# Patient Record
Sex: MALE | Race: WHITE
[De-identification: names, ages, dates, MRNs, and addresses within clinical notes are randomized per-mention and may not be internally consistent; named-entity substitution may affect disease eponyms.]

---

## 2020-04-29 ENCOUNTER — HOSPITAL ENCOUNTER (INPATIENT)
Dept: HOSPITAL 96 - M.ORTHSURG | Age: 75
LOS: 2 days | Discharge: NURSING, REHAB OR LONG-TERM CARE | DRG: 475 | End: 2020-05-01
Attending: INTERNAL MEDICINE | Admitting: INTERNAL MEDICINE
Payer: COMMERCIAL

## 2020-04-29 VITALS — SYSTOLIC BLOOD PRESSURE: 133 MMHG | DIASTOLIC BLOOD PRESSURE: 48 MMHG

## 2020-04-29 VITALS — WEIGHT: 182 LBS | BODY MASS INDEX: 26.05 KG/M2 | HEIGHT: 70 IN

## 2020-04-29 VITALS — SYSTOLIC BLOOD PRESSURE: 145 MMHG | DIASTOLIC BLOOD PRESSURE: 64 MMHG

## 2020-04-29 VITALS — SYSTOLIC BLOOD PRESSURE: 122 MMHG | DIASTOLIC BLOOD PRESSURE: 44 MMHG

## 2020-04-29 DIAGNOSIS — I25.10: ICD-10-CM

## 2020-04-29 DIAGNOSIS — Z96.1: ICD-10-CM

## 2020-04-29 DIAGNOSIS — I73.9: ICD-10-CM

## 2020-04-29 DIAGNOSIS — Z79.2: ICD-10-CM

## 2020-04-29 DIAGNOSIS — R71.0: ICD-10-CM

## 2020-04-29 DIAGNOSIS — Z79.84: ICD-10-CM

## 2020-04-29 DIAGNOSIS — Z89.429: ICD-10-CM

## 2020-04-29 DIAGNOSIS — Z79.82: ICD-10-CM

## 2020-04-29 DIAGNOSIS — Z79.899: ICD-10-CM

## 2020-04-29 DIAGNOSIS — Z87.891: ICD-10-CM

## 2020-04-29 DIAGNOSIS — Z90.89: ICD-10-CM

## 2020-04-29 DIAGNOSIS — E78.5: ICD-10-CM

## 2020-04-29 DIAGNOSIS — I10: ICD-10-CM

## 2020-04-29 DIAGNOSIS — M86.671: Primary | ICD-10-CM

## 2020-04-29 DIAGNOSIS — Z95.820: ICD-10-CM

## 2020-04-29 DIAGNOSIS — Z95.5: ICD-10-CM

## 2020-04-29 DIAGNOSIS — E11.40: ICD-10-CM

## 2020-04-29 DIAGNOSIS — Z95.1: ICD-10-CM

## 2020-04-29 LAB
ABSOLUTE BASOPHILS: 0 THOU/UL (ref 0–0.2)
ABSOLUTE EOSINOPHILS: 0.3 THOU/UL (ref 0–0.7)
ABSOLUTE MONOCYTES: 1.1 THOU/UL (ref 0–1.2)
ANION GAP SERPL CALC-SCNC: 8 MMOL/L (ref 7–16)
BASOPHILS NFR BLD AUTO: 0.1 %
BUN SERPL-MCNC: 25 MG/DL (ref 7–18)
CALCIUM SERPL-MCNC: 8.8 MG/DL (ref 8.5–10.1)
CHLORIDE SERPL-SCNC: 104 MMOL/L (ref 98–107)
CO2 SERPL-SCNC: 29 MMOL/L (ref 21–32)
CREAT SERPL-MCNC: 1 MG/DL (ref 0.6–1.3)
EOSINOPHIL NFR BLD: 3.4 %
GLUCOSE SERPL-MCNC: 134 MG/DL (ref 70–99)
GRANULOCYTES NFR BLD MANUAL: 71.3 %
HCT VFR BLD CALC: 32.9 % (ref 42–52)
HGB BLD-MCNC: 10.8 GM/DL (ref 14–18)
LYMPHOCYTES # BLD: 1.3 THOU/UL (ref 0.8–5.3)
LYMPHOCYTES NFR BLD AUTO: 13.9 %
MCH RBC QN AUTO: 28 PG (ref 26–34)
MCHC RBC AUTO-ENTMCNC: 32.8 G/DL (ref 28–37)
MCV RBC: 85.5 FL (ref 80–100)
MONOCYTES NFR BLD: 11.3 %
MPV: 8.3 FL. (ref 7.2–11.1)
NEUTROPHILS # BLD: 6.9 THOU/UL (ref 1.6–8.1)
NUCLEATED RBCS: 0 /100WBC
PLATELET COUNT*: 307 THOU/UL (ref 150–400)
POTASSIUM SERPL-SCNC: 4 MMOL/L (ref 3.5–5.1)
RBC # BLD AUTO: 3.85 MIL/UL (ref 4.5–6)
RDW-CV: 20 % (ref 10.5–14.5)
SODIUM SERPL-SCNC: 141 MMOL/L (ref 136–145)
WBC # BLD AUTO: 9.6 THOU/UL (ref 4–11)

## 2020-04-29 PROCEDURE — 0Y6H0Z2 DETACHMENT AT RIGHT LOWER LEG, MID, OPEN APPROACH: ICD-10-PCS | Performed by: SURGERY

## 2020-04-29 NOTE — EKG
[place] [address]
Phone:  [phone]                     ELECTROCARDIOGRAM REPORT      
_______________________________________________________________________________
 
Name:         NEERURENEE FRANCO               Room:          [address] [place] 
[facility].#:    [id number]     Account #:     [id number]  
Admission:    20    Attend Phys:   Ismael Henderson
Discharge:                Date of Birth: [birth year]  
Date of Service: 20  Report #:      [id number]
        [id number]
_______________________________________________________________________________
THIS REPORT FOR:  //name//                      
 
                          [facility]
                                       
Test Date:    2020               Test Time:    07:28:18
Pat Name:     RENEE ANDRADE            Department:   
Patient ID:   [id number]            Room:         [facility]
Gender:       M                        Technician:   
:          1945               Requested By: Ulysses Light
Order Number: [id number]    Uma MD:   Darci Padron
                                 Measurements
Intervals                              Axis          
Rate:         74                       P:            38
MA:           147                      QRS:          59
QRSD:         115                      T:            261
QT:           363                                    
QTc:          403                                    
                           Interpretive Statements
Sinus rhythm
Nonspecific intraventricular conduction delay
Early repolarization
No previous ECG available for comparison
 
Electronically Signed On 2020 17:29:30 CDT by Darci Padron
[email or web address]
 
 
 
 
 
 
 
 
 
 
 
 
 
 
 
 
 
 
 
  <ELECTRONICALLY SIGNED>
                                           By: Darci Padron MD, [facility]   
  20     1729
D: 20   _____________________________________
T: 20   Darci Padron MD, FACC     /EPI

## 2020-04-29 NOTE — OP
[facility] 
[address] [place]  [address]                    OPERATIVE REPORT              
_______________________________________________________________________________
 
Name:       RENEE ANDRADE STEPHEN                Room:           [address] [place]  
.R.#:  [id number]      Account #:      [id number]  
Admission:  04/29/20     Attend Phys:    HILARIA Gilliland
Discharge:               Date of Birth:  [birth year]  
         Report #: [id number]
                                                                     [id number]  
_______________________________________________________________________________
THIS REPORT FOR:  //name//                      
 
cc:  Neri Delarosa MD, John MD                                                     ~
THIS REPORT FOR:  //name//                      
 
CC: Neri Henderson
 
DATE OF SERVICE:  04/29/2020
 
 
PREOPERATIVE DIAGNOSIS:  Chronic osteomyelitis, right foot in need a below-knee
amputation.
 
POSTOPERATIVE DIAGNOSIS:  Chronic osteomyelitis, right foot in need a below-knee
amputation.
 
PROCEDURE:  Right below-knee amputation.
 
COMPLICATIONS:  None.
 
ESTIMATED BLOOD LOSS:  200 mL.
 
SPECIMEN:  Includes right below-knee amputation.
 
SURGEON:  Ulysses Light MD
 
ASSISTANT:  JHON Thomason.
 
COMPLICATIONS:  None.
 
INDICATIONS FOR PROCEDURE:  The patient is a very pleasant 74-year-old white
male who has diabetes.  He has nonhealing diabetic foot ulcer of his right foot 
with now osteomyelitis of the calcaneus bone.  His foot was no longer
salvageable.  We plan an urgent right below-knee amputation.  Informed consent
was obtained from the patient with risks including but not limited to bleeding,
infection, need for further surgery, pain, death, heart attack, stroke, higher
amputation.  The patient understood these risks and was agreeable to proceed.
 
DESCRIPTION OF PROCEDURE:  The patient was taken to the OR and placed in supine
position.  After adequate general anesthesia was initiated, his right leg was
prepped and draped in usual sterile fashion.  Timeout was performed.  I created
a transverse incision across the tibia just one handbreadth below the tibial
tuberosity.  Sharp and blunt dissections were carried down the tibia and fibula.
 
 
 
[place] [address]                    OPERATIVE REPORT              
_______________________________________________________________________________
 
Name:       RENEE ANDRADE                Room:           [address] [place]  
[facility]#:  [id number]      Account #:      [id number]  
Admission:  04/29/20     Attend Phys:    HILARIA Gilliland
Discharge:               Date of Birth:  [birth year]  
         Report #: [id number]
                                                                     [id number]  
_______________________________________________________________________________
 These were divided with a bone saw.  I then used an amputation knife to create
a posterior flap with the calf muscle.  Specimen was removed off the field. 
Bleeding was controlled with interrupted 2-0 silk suture as well as
electrocautery.  I irrigated with 1 liter of antibiotic saline.  I closed the
flap anteriorly using interrupted 2-0 Vicryl, 3-0 Vicryl and staples for the
skin.  Incision was dressed with Prevena wound VAC.  The patient was taken alert
and awake to recovery room in good condition.  All needle and instrument counts
were correct.
 
 
 
 
 
 
 
 
 
 
 
 
 
 
 
 
 
 
 
 
 
 
 
 
 
 
 
 
 
 
 
 
 
 
 
 
<ELECTRONICALLY SIGNED>
                                        By:  Ulysses Light MD             
04/29/20     1056
D: 04/29/20 1004_______________________________________
T: 04/29/20 101MD aníbal Coburn

## 2020-04-29 NOTE — NUR
PATIENT RESTING IN BED. RIGHT LEG ELEVATED ON PILLOW. DRESSING TO RIGHT LEG
CLEAN, DRY AND INTACT WITH KNEE IMMOBILIZER ON. PATIENT HAS COMPLAINTS OF
MINIMAL PAIN, HYDROCODONE GIVEN X 1. PATIENT HAS GOOD APPETITE. PATIENT DENIES
ANY NEEDS AT THIS TIME. CALL LIGHT WITHIN REACH.

## 2020-04-30 VITALS — SYSTOLIC BLOOD PRESSURE: 135 MMHG | DIASTOLIC BLOOD PRESSURE: 46 MMHG

## 2020-04-30 VITALS — DIASTOLIC BLOOD PRESSURE: 52 MMHG | SYSTOLIC BLOOD PRESSURE: 146 MMHG

## 2020-04-30 VITALS — DIASTOLIC BLOOD PRESSURE: 59 MMHG | SYSTOLIC BLOOD PRESSURE: 133 MMHG

## 2020-04-30 VITALS — DIASTOLIC BLOOD PRESSURE: 51 MMHG | SYSTOLIC BLOOD PRESSURE: 138 MMHG

## 2020-04-30 VITALS — DIASTOLIC BLOOD PRESSURE: 58 MMHG | SYSTOLIC BLOOD PRESSURE: 116 MMHG

## 2020-04-30 LAB
ABSOLUTE BASOPHILS: 0 THOU/UL (ref 0–0.2)
ABSOLUTE EOSINOPHILS: 0.2 THOU/UL (ref 0–0.7)
ABSOLUTE MONOCYTES: 1.1 THOU/UL (ref 0–1.2)
ANION GAP SERPL CALC-SCNC: 7 MMOL/L (ref 7–16)
BASOPHILS NFR BLD AUTO: 0.4 %
BUN SERPL-MCNC: 22 MG/DL (ref 7–18)
CALCIUM SERPL-MCNC: 8.2 MG/DL (ref 8.5–10.1)
CHLORIDE SERPL-SCNC: 104 MMOL/L (ref 98–107)
CO2 SERPL-SCNC: 27 MMOL/L (ref 21–32)
CREAT SERPL-MCNC: 1 MG/DL (ref 0.6–1.3)
EOSINOPHIL NFR BLD: 1.9 %
GLUCOSE SERPL-MCNC: 101 MG/DL (ref 70–99)
GRANULOCYTES NFR BLD MANUAL: 76.8 %
HCT VFR BLD CALC: 26.4 % (ref 42–52)
HGB BLD-MCNC: 8.6 GM/DL (ref 14–18)
LYMPHOCYTES # BLD: 1.2 THOU/UL (ref 0.8–5.3)
LYMPHOCYTES NFR BLD AUTO: 11.1 %
MCH RBC QN AUTO: 28 PG (ref 26–34)
MCHC RBC AUTO-ENTMCNC: 32.6 G/DL (ref 28–37)
MCV RBC: 85.7 FL (ref 80–100)
MONOCYTES NFR BLD: 9.8 %
MPV: 8.2 FL. (ref 7.2–11.1)
NEUTROPHILS # BLD: 8.3 THOU/UL (ref 1.6–8.1)
NUCLEATED RBCS: 0 /100WBC
PLATELET COUNT*: 240 THOU/UL (ref 150–400)
POTASSIUM SERPL-SCNC: 4.3 MMOL/L (ref 3.5–5.1)
RBC # BLD AUTO: 3.08 MIL/UL (ref 4.5–6)
RDW-CV: 19.4 % (ref 10.5–14.5)
SODIUM SERPL-SCNC: 138 MMOL/L (ref 136–145)
WBC # BLD AUTO: 10.8 THOU/UL (ref 4–11)

## 2020-04-30 NOTE — NUR
PATIENT GIVEN PRN HYDROCODONE FOR RIGHT LEG PAIN, GOOD RELIEF NOTED. IV
REMAINS SL, FLUSHING WITHOUT DIFFICULTY. PATIENT C/O BEING SWEATY THIS
AFTERNOON AT APPROX 1400, BS AT THAT TIME 239 AND TEMP 99.3. PATIENT CHECKED
ON AGAIN SOON AFTER AND TEMP NOTED TO BE 98.4 AND PATIENT STATED HE WAS NO
LONGER SWEATY AND WAS FEELING FINE. INSULIN GIVEN WITH MEALS AS ORDERED. PT TO
WORK WITH PATIENT TOMORROW. WILL ATTEMPT TO HELP PATIENT INTO CHAIR FOR
DINNER. HINTON DRAINING LARGE AMOUNTS OF CLEAR YELLOW URINE. RIGHT LEG DRESSING
D/I AND WOUND VAC REMAINS IN PLACE. REHAB CONSULT PLACED.

## 2020-04-30 NOTE — NUR
PT A&O, VSS ON 1-2L O2 BY NC. MEDS GIVEN AS ORDERED. DRESSING C/D/I. PAIN
MANAGED WITH NORCO. PT COULD NOT VOID, 821ML ON BLADDER SCAN. HINTON CATH
PLACED. RT LEG ELEVATED ON PILLOW. KNEE IMMOBILIZER, WOUND VAC IN PLACE.
HOURLY ROUNDINGS COMPLETED. WILL CONTINUE TO MONITOR.

## 2020-04-30 NOTE — NUR
SW called pt to complete initial assessment, introduce self, and SW role.  Pt
alert, oriented, pleasant, talkative.  Pt lives at home with his wife in a
split entry home.  Pt has hx of inpt rehab at [place] and says he learned
how to go up and down stairs on his bottom.  pt has 2 wc, 2 walkers, crutches
and a shower chair and grab bars.  Pt planning for inpt rehab at [place] and would
prefer inpt rehab at [facility].  Pt said he wasn't able to participate in therapies
yet because of issues with catheterization but understands that he will work
with therapies and will need to be evaluated to determine if pt is able to be
accepted to inpt rehab.  Pt has supportive family.  SW to continue to follow
to assist with safe dc planning.

## 2020-05-01 ENCOUNTER — HOSPITAL ENCOUNTER (INPATIENT)
Dept: HOSPITAL 96 - M.REH | Age: 75
LOS: 14 days | Discharge: HOME HEALTH | DRG: 638 | End: 2020-05-15
Attending: PHYSICAL MEDICINE & REHABILITATION | Admitting: PHYSICAL MEDICINE & REHABILITATION
Payer: COMMERCIAL

## 2020-05-01 VITALS — DIASTOLIC BLOOD PRESSURE: 64 MMHG | SYSTOLIC BLOOD PRESSURE: 122 MMHG

## 2020-05-01 VITALS — SYSTOLIC BLOOD PRESSURE: 140 MMHG | DIASTOLIC BLOOD PRESSURE: 53 MMHG

## 2020-05-01 VITALS — SYSTOLIC BLOOD PRESSURE: 123 MMHG | DIASTOLIC BLOOD PRESSURE: 62 MMHG

## 2020-05-01 VITALS — DIASTOLIC BLOOD PRESSURE: 59 MMHG | SYSTOLIC BLOOD PRESSURE: 108 MMHG

## 2020-05-01 VITALS — WEIGHT: 182.5 LBS | HEIGHT: 70.98 IN | BODY MASS INDEX: 25.55 KG/M2

## 2020-05-01 DIAGNOSIS — I25.10: ICD-10-CM

## 2020-05-01 DIAGNOSIS — N40.0: ICD-10-CM

## 2020-05-01 DIAGNOSIS — Z89.511: ICD-10-CM

## 2020-05-01 DIAGNOSIS — Z79.899: ICD-10-CM

## 2020-05-01 DIAGNOSIS — E11.69: Primary | ICD-10-CM

## 2020-05-01 DIAGNOSIS — Z95.1: ICD-10-CM

## 2020-05-01 DIAGNOSIS — E11.51: ICD-10-CM

## 2020-05-01 DIAGNOSIS — D63.8: ICD-10-CM

## 2020-05-01 DIAGNOSIS — M86.68: ICD-10-CM

## 2020-05-01 DIAGNOSIS — I10: ICD-10-CM

## 2020-05-01 DIAGNOSIS — D50.9: ICD-10-CM

## 2020-05-01 DIAGNOSIS — E78.5: ICD-10-CM

## 2020-05-01 LAB
ABSOLUTE BASOPHILS: 0 THOU/UL (ref 0–0.2)
ABSOLUTE EOSINOPHILS: 0.1 THOU/UL (ref 0–0.7)
ABSOLUTE MONOCYTES: 1 THOU/UL (ref 0–1.2)
ALBUMIN SERPL-MCNC: 3 G/DL (ref 3.4–5)
ALP SERPL-CCNC: 85 U/L (ref 46–116)
ALT SERPL-CCNC: 23 U/L (ref 30–65)
ANION GAP SERPL CALC-SCNC: 9 MMOL/L (ref 7–16)
AST SERPL-CCNC: 25 U/L (ref 15–37)
BASOPHILS NFR BLD AUTO: 0.2 %
BILIRUB SERPL-MCNC: 0.3 MG/DL
BUN SERPL-MCNC: 18 MG/DL (ref 7–18)
CALCIUM SERPL-MCNC: 8.5 MG/DL (ref 8.5–10.1)
CHLORIDE SERPL-SCNC: 101 MMOL/L (ref 98–107)
CO2 SERPL-SCNC: 30 MMOL/L (ref 21–32)
CREAT SERPL-MCNC: 1.1 MG/DL (ref 0.6–1.3)
EOSINOPHIL NFR BLD: 1.4 %
GLUCOSE SERPL-MCNC: 116 MG/DL (ref 70–99)
GRANULOCYTES NFR BLD MANUAL: 80.8 %
HCT VFR BLD CALC: 28.3 % (ref 42–52)
HGB BLD-MCNC: 9.4 GM/DL (ref 14–18)
IRON SERPL-MCNC: 12 UG/DL (ref 50–175)
LYMPHOCYTES # BLD: 0.9 THOU/UL (ref 0.8–5.3)
LYMPHOCYTES NFR BLD AUTO: 8.2 %
MCH RBC QN AUTO: 28.5 PG (ref 26–34)
MCHC RBC AUTO-ENTMCNC: 33.1 G/DL (ref 28–37)
MCV RBC: 86.1 FL (ref 80–100)
MONOCYTES NFR BLD: 9.4 %
MPV: 9 FL. (ref 7.2–11.1)
NEUTROPHILS # BLD: 8.6 THOU/UL (ref 1.6–8.1)
NUCLEATED RBCS: 0 /100WBC
PLATELET COUNT*: 277 THOU/UL (ref 150–400)
POTASSIUM SERPL-SCNC: 3.9 MMOL/L (ref 3.5–5.1)
PROT SERPL-MCNC: 7.2 G/DL (ref 6.4–8.2)
RBC # BLD AUTO: 3.29 MIL/UL (ref 4.5–6)
RDW-CV: 19.1 % (ref 10.5–14.5)
SAO2 % BLD FROM PO2: 5 % (ref 20–39)
SODIUM SERPL-SCNC: 140 MMOL/L (ref 136–145)
WBC # BLD AUTO: 10.7 THOU/UL (ref 4–11)

## 2020-05-01 NOTE — NUR
PT A&O, ON RA. DRESSING TO RT STUMP INTACT. WOUND VAC IN PLACE. MEDS GIVEN AS
ORDERED. PAIN WELL CONTROLLED WITH NORCO. PT SLEEPING THROUGH THE NIGHT. HINTON
IN PLACE. HOURLY ROUNDINGS COMPLETED. WILL CONTINUE TO MONITOR.

## 2020-05-01 NOTE — NUR
PATIENT UP TO CHAIR AM WITH PT, SAT IN RECLINER FOR APPROX 2 HOURS. PATIENT
WORKED WITH PT AND OT THIS SHIFT.  IMMOBILIZER IN PLACE TO RIGHT BKA, WOUND
VAC IN PLACE WITH SMALL AMOUNT OF BLOODY DRAINAGE NOTED. JAMAAL STARR'HILARIA AT 0900,
DR. CARDONA NOTIFIED THAT PATIENT HAD NOT VOIDED AT 1400, PER DR. CARDONA GIVE
PATIENT MORE TIME TO VOID ON HIS OWN. IV REMAINS SL. HYDROCODONE GIVEN X 1
THIS SHIFT. PATIENT TO DISCHARGE UP TO REHAB THIS EVENING, PATIENT AWARE OF
PLAN OF CARE.

## 2020-05-01 NOTE — PATH
[address]  [address]                    PATHOLOGY RPT PROCEDURE       
_______________________________________________________________________________
 
Name:       RENEE ANDRADE                Room:           [address] [place]  
M.R.#:  [id number]      Account #:      [id number]  
Admission:  04/29/20     Date of Birth:  [birth year]  
Discharge:                             Report #:    [id number]
                                                         Path Case #: [id number]
_______________________________________________________________________________
 
[facility] Accession Number: [id number]
.                                                                01
Material submitted:                                        .
leg - RIGHT BELOW THE KNEE LEG. Modifiers: right
.                                                                01
Clinical history:                                          .
Atherosclerosis right leg
.                                                                02
**********************************************************************
Diagnosis:
Right below knee leg:
- Benign right lower leg with evidence of prior transmetatarsal
amputations of all five toes and non-specific ulceration at distal/plantar
aspect with underlying bone showing osteomyelitis and reparative changes.
- Severe calcifying arteriosclerosis of anterior and posterior tibial
arteries.
(RONNIE/db; 5/01/2020)
LBQ  05/01/2020  1223 Local
**********************************************************************
.                                                                02
Electronically signed:                                     .
Mariano Norris MD, Pathologist
NPI- [id number]
.                                                                01
Gross description:                                         .
The specimen is received fresh in a red biohazard bag, labeled "Renee Andrade, right below knee leg".  Received is a right below-the-knee
amputation measuring 14.2 cm from heel to distal foot stump, 15.0 cm from
heel to skin margin, 29.3 cm from heel to fibular bone margin, and 31.0 cm
from heel to tibial bone margin.  The skin and soft tissue margins appear
viable.  All five toes are absent due to a prior transmetatarsal
amputation.  At the distal aspect of the foot and continuing onto the
plantar aspect, there is a well circumscribed, irregular in contour,
partially crusted and yellow-tan to pink-red lesion measuring 12.8 x 10.4
cm.  Sectioning through the anterior and posterior tibial vasculatures
reveals pinpoint to slightly patent lumens with a slight amount of
calcification identified.  The specimen is submitted representatively as
follows:
.
A1   skin and soft tissue margin
A2   representative section of bone underlying pink-right aspect of lesion
on plantar aspect of foot, following decalcification
A3   additional representative sections of lesion on dorsal/plantar aspect
of foot
A4   anterior and posterior tibial vasculatures.
(CAA; 4/30/2020)
QAC/QAC  05/01/2020  [facility]
.                                                                02
 
[place] [address]                    PATHOLOGY RPT PROCEDURE       
_______________________________________________________________________________
 
Name:       RENEE ANDRADE                Room:           [address] [place]  
..#:  [id number]      Account #:      [id number]  
Admission:  04/29/20     Date of Birth:  [birth year]  
Discharge:                             Report #:    [id number]
                                                         Path Case #: [id number]
_______________________________________________________________________________
Pathologist provided ICD-10:
I70.239, M86.8X6
.                                                                02
CPT                                                        .
604848, 151903
Specimen Comment: A courtesy copy of this report has been sent to [phone],
[phone]
Specimen Comment: [id number], [phone]
Specimen Comment: Report sent to ,DR OLGUIN / DR RASHEED
***Performed at:  01
   [facility] [address], [place], [place]  [id number]
   MD Emilio Clifford MD Phone:  [phone]
***Performed at:  02
   [facility]
   [address], [place], [place]  [id number]
   MD Mariano Norris MD Phone:  [phone]

## 2020-05-01 NOTE — NUR
PATIENT IN SEMI-DIAZ'S TALKING ON HIS CELL.  STATES RIGHT STUMP PAIN AT A
"4".  WOUND VAC TO RIGHT STUMP INTACT WITH RED DRAINAGE.  HAS IMMOBILIZER TO
RIGHT LEG IN PLACE.  SNACK PROVIDED.  ADMISSION REHAB TOOL COMPLETED.  CALL
LIGHT WITHIN REACH.  A/O X 4.  VERY TALKATAIVE.  HARD OF HEARING BILATERALLY.
PLEASANT.  VOIDED PER URINAL EARLIER.  ADMITTED WITH RIGHT BKA.  ARRIVED FROM
ORTHO UNIT AT 1855 PER REPORT.  INITIALLY UPSET ABOUT PRN HYDROCODONE BEING
REDUCED FROM 2 PILLS EVERY 4 HOURS TO ONE EVERY 6 HOURS.  ASSURED PATIENT THAT
WOULD CALL DOCTOR IF NEEDED IF PAIN WASN'T UNDER CONTROL.

## 2020-05-01 NOTE — NUR
PATIENT DISCHARGED TO REHAB AT THIS TIME. PATIENT TAKEN VIA WHEELCHAIR WITH
ALL BELONGINGS. REPORT CALLED TO DIOGENES CAMARILLO.

## 2020-05-02 VITALS — DIASTOLIC BLOOD PRESSURE: 93 MMHG | SYSTOLIC BLOOD PRESSURE: 116 MMHG

## 2020-05-02 VITALS — DIASTOLIC BLOOD PRESSURE: 51 MMHG | SYSTOLIC BLOOD PRESSURE: 142 MMHG

## 2020-05-02 LAB
ANION GAP SERPL CALC-SCNC: 7 MMOL/L (ref 7–16)
BUN SERPL-MCNC: 17 MG/DL (ref 7–18)
CALCIUM SERPL-MCNC: 8.1 MG/DL (ref 8.5–10.1)
CHLORIDE SERPL-SCNC: 102 MMOL/L (ref 98–107)
CO2 SERPL-SCNC: 31 MMOL/L (ref 21–32)
CREAT SERPL-MCNC: 1 MG/DL (ref 0.6–1.3)
GLUCOSE SERPL-MCNC: 125 MG/DL (ref 70–99)
HCT VFR BLD CALC: 26 % (ref 42–52)
HGB BLD-MCNC: 8.4 GM/DL (ref 14–18)
MCH RBC QN AUTO: 28 PG (ref 26–34)
MCHC RBC AUTO-ENTMCNC: 32.5 G/DL (ref 28–37)
MCV RBC: 86.1 FL (ref 80–100)
MPV: 8.3 FL. (ref 7.2–11.1)
PLATELET COUNT*: 234 THOU/UL (ref 150–400)
POTASSIUM SERPL-SCNC: 3.8 MMOL/L (ref 3.5–5.1)
RBC # BLD AUTO: 3.02 MIL/UL (ref 4.5–6)
RDW-CV: 19.1 % (ref 10.5–14.5)
SODIUM SERPL-SCNC: 140 MMOL/L (ref 136–145)
WBC # BLD AUTO: 10 THOU/UL (ref 4–11)

## 2020-05-02 NOTE — NUR
PT A&Ox4. VITALS STABLE. TOLERATING DIET. PAIN CONTROLLED WITH NORCO. DENIED
N/V. UP WITH 1 USING GAIT BELT AND WALKER. DRESSING C/D/I. WOUND VAC IN PLACE.
IMMOBILIZER IN PLACE. FALL PRECAUTIONS IN PLACE. CALL LIGHT WITHIN REACH. WILL
CONTINUE TO MONITOR.

## 2020-05-02 NOTE — NUR
GAVE PAIN MEDICATION AT 0350 FOR COMPLAINT OF RIGHT STUMP PAIN RATED "2" WITH
RELIEF.  USED URINAL DURING THE NIGHT.  HOURLY ROUNDING IN PROGRESS.

## 2020-05-03 VITALS — DIASTOLIC BLOOD PRESSURE: 53 MMHG | SYSTOLIC BLOOD PRESSURE: 125 MMHG

## 2020-05-03 VITALS — DIASTOLIC BLOOD PRESSURE: 49 MMHG | SYSTOLIC BLOOD PRESSURE: 136 MMHG

## 2020-05-03 NOTE — NUR
PT A&Ox4. VITALS STABLE. DRESSING C/D/I. WOUND VAC PATENT AND IN PLACE. PAIN
CONTROLLED WITH NORCO. TOLERATING DIET. UP WITH STB ASSIST. FALL PRECAUTIONS
IN PLACE. CALL LIGHT WITHIN REACH. WILL CONTINUE TO MONITOR.

## 2020-05-03 NOTE — NUR
SITTING UP IN BED.  IN GOOD SPIRITS.  AMBULATED TO THE BATHROOM WITH SBA,
GAITBELT, WALKER.  HOPS ON LEFT LEG.  DOES OWN CLOTHING ADJUSTMENTS AND ALBINO
CARE.  DECLINED OFFER OF A SNACK.  CALL LIGHT AND URINAL WITHIN REACH.

## 2020-05-03 NOTE — NUR
PATIENT SLEPT MOST OF THE NIGHT. PATIENT WAS GIVEN PAIN MEDICINE ONCE THIS
SHIFT. IMOBILIZER REMAINS IN PLACE TO R STUMP WITH PROVENA WOUND VAC IN PLACE.
WILL CONTINUE TO MONITOR.

## 2020-05-04 VITALS — SYSTOLIC BLOOD PRESSURE: 155 MMHG | DIASTOLIC BLOOD PRESSURE: 53 MMHG

## 2020-05-04 VITALS — DIASTOLIC BLOOD PRESSURE: 52 MMHG | SYSTOLIC BLOOD PRESSURE: 128 MMHG

## 2020-05-04 NOTE — NUR
PATIENT VERBALIZED UNDERSTANDING OF DISCHARGE INSTRUCTIONS.  DENIES NEED FOR
PAIN MEDICATION.  PACEMAKER INCISION HEALED.  UP WITH STAND BY ASSIST GAIT
BELT AND WALKER TO REMAIN 50%WIGHTBEARING TO LEFT LEG.  HAS BRUIT AND THRILL
AT LEFT ARM FISTULA SITE.  WRITTEN RX GIVEN TO PATIENT.  D/C VIA W/C TO FAMILY
CAR.

## 2020-05-04 NOTE — NUR
Initial rehab assessment: Pt lives at home with wife in a split entry home.
Pt has supportive family and friends.  Pt has hx of ARU at [facility].  Pt
has DME already at home: 2 wcs, 2 RWs, crutches, grab bars, shower chair.  SW
to continue to follow to assist with safe dc planning.

## 2020-05-04 NOTE — NUR
Nutrition: Pt admited to Rehab with Rt BUCK. Wt: 182#. Consult stated he left
his dentures t home. Per notes and per RN, he is eating adequately. CHO
controlled diet. Alb 3, -272. Meds noted, on insulin. H/o DM, HTN, PVD,
CAD. GOALS: tight BG control, >75% po intake. Appears at mild risk at this
time.

## 2020-05-04 NOTE — NUR
PAIN MEDICINE GIVEN FOR COMPLAINT OF RIGHT LEG PAIN WITH RELIEF.  ONLY 42ML
RESIDUAL AFTER VOID AT MIDNIGHT.  HOURLY ROUNDING IN PROGRESS.

## 2020-05-04 NOTE — NUR
ALERT AND ORIENTED X4.  UP WITH 1 ASSIST, GAIT BELT AND WALKER.  USES PO PAIN
MEDICATION TO HELP KEEP PAIN IN RIGHT BKA UNDER CONTROL.  HAS PROVENA WOUND
VAC IN PLACE AT THIS TIME TO RIGHT BKA.  DRESSING DRY AND INTACT.  USES
IMMOBILZER TO RIGHT STUMP.  USES CALL LIGHT WHEN NEEDING ASSIST.  FALL
PRECAUTIONS IN PLACE.

## 2020-05-05 VITALS — DIASTOLIC BLOOD PRESSURE: 61 MMHG | SYSTOLIC BLOOD PRESSURE: 145 MMHG

## 2020-05-05 VITALS — DIASTOLIC BLOOD PRESSURE: 44 MMHG | SYSTOLIC BLOOD PRESSURE: 124 MMHG

## 2020-05-05 NOTE — NUR
ASSUMMED CARE OF PT AT 0730, PT ALERT AND ORIENTED, PT TRANSFERS WITH ASSIST
OF ONE WITH A STAND PIVOT, HOPS INTO BATHROOM WITH GB AND WALKER, VOIDS PER
TOILET, BLADDER SCANNED AFTER VOID WITH 10CC RESIDUAL, NO BM THIS SHIFT,
STATES HAS PAIN IN STUMP OF "1" BUT STATES HE NEEDS THE STRONG PAIN PILL
BECAUSE HE IS AFRAID OF ANY PAIN, EDUCATED PT ON USE OF NARCOTICS BUT DOES NOT
WANT TYLENOL, HYDROCODONE GIVEN X 2 THIS SHIFT, NO BM X 3 DAYS, MEDS GIVEN,
PROVENA WOUND VAC INTACT WITH SEROUS SANGUINOUS DRAINAGE, MEPILEX CHANGED TO
WRIST SKIN TEAR, DRESSING AND IMMOBILIZER INTACT TO RIGHT STUMP, PARTICIPATED
IN ALL THERAPIES, HOURLY ROUNDING COMPLETED, ASSESSMENT COMPLETE, WILL
CONTINUE TO MONITOR.

## 2020-05-05 NOTE — NUR
ASSUMED CARE AT 1920. ALERT AND ORIENTED PLEASANT. C/O RIGHT STUMP PAIN. PAIN
MEDS GIVEN PER PT REQUEST. DRESSING AND WOUND VAC TO RIGHT STUMP. IMMOBILIZER
IN PLACE. MIN ASSIST WITH GAIT BELT AND WALKER. UP TO BR. AT 2100,  CC
BUT PT REFUSED STRAIGHT CATH. PVR RECHECK AT 2400  CC. SLEPT SOME. CALL
LIGHT IN REACH AND BED ALARM ON.

## 2020-05-06 VITALS — SYSTOLIC BLOOD PRESSURE: 141 MMHG | DIASTOLIC BLOOD PRESSURE: 48 MMHG

## 2020-05-06 VITALS — SYSTOLIC BLOOD PRESSURE: 158 MMHG | DIASTOLIC BLOOD PRESSURE: 86 MMHG

## 2020-05-06 NOTE — NUR
LATE ENTRY FOR 5/5/20 AT 1130-PT.CALLED THIS CM. HE WOULD LIKE AT 16 IN.WC
ORDERED FOR HIM. HE HAS 2 OTHER WC'S AT HOME BUT THEY ARE TOO BIG TO GO
THROUGH HIS DOORWAYS. HE WOULD LIKE REMOVALBLE ARM RESTS AND LEG RESTS. WILL
NEED TO HAVE WRITE PRESCRIPTION FOR WC. NOTIFIED NURSING. EXPLAINED TO
PT.HIS INSURANCE CONTRACTS WITH [employer]. HE IS FINE WITH USING [facility].

## 2020-05-06 NOTE — NUR
ASSUMED CARES AT 1920. ALERT AND ORIENTED. PLEASANT. NORCO GIVEN FOR RIGHT
STUMP PAIN. WOUND VAC AND IMMOBILIZER TO RLE. MIN ASSIST WITH GAIT BELT AND
WALKER. UP TO BATHROOM. MEPILEX TO RIGHT FOREARM CHANGED. WAS A SKIN TEAR FROM
HOME. PIC TAKEN. SLEPT WELL. CALL LIGHT IN REACH AND BED ALARM ON.

## 2020-05-06 NOTE — NUR
SW called and spoke with pt wife to review team conference summary and plan
for pt to remain on rehab unit to continue therapies at least another week
with team to reasses pt length of stay during team conference next Wednesday.
Pt wife in agreement with plan and says that she will call pt and attempt to
remind pt to "behave" and compromise with therapy to try new ways to be safe
and progress in therapy.  SW to continue to follow to assist with safe dc
planning.

## 2020-05-06 NOTE — NUR
ASSUMMED CARE OF PT AT 0730, PT ALERT AND ORIENTED, PT TRANSFERS WITH SBA, GB
WALKER, PROVENA WD VAC DCD AND WD REDRESSED PER WES FROM [facility]ASC[facility], C/O
PAIN, MEDICATED PER ORDER, TAKING FOOD AND FLUIDS WELL,  CONSULTED FOR
STUMP  AND WILL SEE PT ON 5/7 AT 11 AM. MEPILEX INTACT TO RIGHT
FOREARM, LARGE BM X 1, PARTICIPATED IN ALL THERAPIES, HOURLY ROUNDING
COMPLETED, ASSESSMENT COMPLETE, WILL CONTINUE TO MONITOR.

## 2020-05-07 VITALS — DIASTOLIC BLOOD PRESSURE: 61 MMHG | SYSTOLIC BLOOD PRESSURE: 144 MMHG

## 2020-05-07 VITALS — SYSTOLIC BLOOD PRESSURE: 134 MMHG | DIASTOLIC BLOOD PRESSURE: 55 MMHG

## 2020-05-07 NOTE — NUR
ASSUMED CARE @ 1935-5/6-WED.SITS INN BED ON PHONE.IMMOBILIZER IN PLACE RIGHT
LE.PRN COLACE GIVEN ORAL @ 2115.SEE PAIN MANAGEMENT @ 2115.GAVE SELF INSULIN
INJECTION @ 2120.TURNS SELF @ NIGHT.SBA FOR ALL TRANSFERS & TOILETING.NWB
RIGHT LE.ON HOURLY ROUNDS.CNA DOING ODD HOUR ROUNDS.

## 2020-05-07 NOTE — NUR
RESTING QUIETLY IN BED AND TALKING ON PHONE AND WATCHING TV.  PAIN MEDICATION
GIVEN FOR COMPLAINT OF RIGHT STUMP PAIN RATED "1".  HAS STUMP  AND AN
IMMOBILIZER ON RIGHT STUMP.  CALL LIGHT WITHIN REACH.  TAKES MEDICATION WHOLE
WITH WATER.

## 2020-05-07 NOTE — NUR
PATIENT A&OX4. VSS. LUNG SOUNDS CLEAR. PATIENT GIVEN NORCO THIS AM TO
PREMEDICATE FOR THERAPY. PATIENT PARTICIPATED IN ALL THERAPIES TODAY. BELLE
CAME TODAY AT 1100 FOR STUMP . PATIENT REFUSED , STATED HE USES
[facility]. CALL MADE TO [facility] AND FAXED ORDER TO
DIANA. DIANA STATED THEY WOULD BE HERE SOME TIME 5/7 OR 5/8. PATIENT NOT
GIVEN INSULIN FOR SUPPER D/T BLOOD SUGAR BEING 89. PATIENT IN CHAIR. CALL
LIGHT IN REACH

## 2020-05-07 NOTE — NUR
SLEPT LATE @ 0000-5/7-THURS.SLEEPING GOOD ALL NIGHT.TOOK ALL VANILLA PUDDING
& GLUCERNA BALTAZAR DRINK AS HS SNACKS.BRP W/ SBA X3.BLADDER SCAN @ 0205 AFTER
VOIDING-0 ML.

## 2020-05-07 NOTE — NUR
ORTHOTICS CAME AT 11AM WITH STUMP . PATIENT STATED HE DID NOT
WANT TO USE  DUE TO ISSUES IN THE PAST. CALL TO WES LOVETT NP FOR
[facility]. SHE WROTE THE ORDER FOR [facility] ([facility]), BUT STATED THE NURSE SAID [facility] ONLY WORKS WITH . LEFT
MESSAGE WITH MARSHALL MONTOYA TO RETURN CALL TO [facility].

## 2020-05-08 VITALS — DIASTOLIC BLOOD PRESSURE: 50 MMHG | SYSTOLIC BLOOD PRESSURE: 144 MMHG

## 2020-05-08 VITALS — DIASTOLIC BLOOD PRESSURE: 51 MMHG | SYSTOLIC BLOOD PRESSURE: 141 MMHG

## 2020-05-08 NOTE — NUR
UP X ONE DURING THE NIGHT TO THE BATHROOM TO VOID AND HAD A LARGE BM.  NO
FURTHER COMPLAINT OF PAIN.  HOURLY ROUNDING IN PROGRESS.

## 2020-05-08 NOTE — NUR
ALERT AND ORIENTED X4.  UP WITH 1 ASSIST, GAIT BELT AND WALKER.  HOPS ON LEFT
LEFT WHEN AMBULATING.  WEARS IMMOBILIZER TO RIGHT BKA.  STUMP  IN
PLACE UNDER IMMOBILIZER.  PVR CHECKED AND 7ML.  TOOK PO PAIN MEDICATION THIS
AM AND HELPFUL.  USES CALL LIGHT WHEN NEEDING ASSIST.  FALL PRECATIONS IN
PLACE.

## 2020-05-09 VITALS — SYSTOLIC BLOOD PRESSURE: 149 MMHG | DIASTOLIC BLOOD PRESSURE: 62 MMHG

## 2020-05-09 VITALS — DIASTOLIC BLOOD PRESSURE: 56 MMHG | SYSTOLIC BLOOD PRESSURE: 146 MMHG

## 2020-05-09 NOTE — NUR
ASSUMED CARE @ 1912-5/8-FRIDAY.SITS IN W/C IN ROOM W/ RIGHT STUMP UP W/
IMMOBILIZER ON RIGHT LE.CHAIR ALARM PUT ON @ 1912.REQUESTED PRN COLACE & GIVEN
@ 2024.SEE PAIN MANAGEMENT @ 2140.HOB UP IN BED.SIDERAILS X4 UP.WANTS ALL
LIGHTS OFF & DOOR CLOSED @ NIGHT.TURNS SELF @ NIGHT.NWB RIGHT LE.TRANSFERS,
TOILETING & AMBULATION W/ WALKER & GB W/ SBA.GAIT-STEADY.ON HOURLY ROUNDS.
CNA DOING ODD HOUR ROUNDS.

## 2020-05-09 NOTE — NUR
SLEPT LATE @ 0000-5/9-SAT & SLEEPING GOOD ALL NIGHT.TOOK ALL VANILLA PUDDING &
BALTAZAR.GLUCERNA AS HS SNACKS.BRP W/ SBA X3 DURING NIGHT.

## 2020-05-09 NOTE — NUR
ALERT AND ORIENTED X4.  UP WITH 1 ASSIST, GAIT BELT AND WALKER AND HOPS TO
BATHROOM.  RIGHT BKA INCISION WITH STAPLES APROXIMATED WELL.  DRESSING CHANGED
WITH NO S/S OF INFECTION.  STUMP  IN PLACE OVER RIGHT BKA AND
COVERED WITH IMMOBILIZER.  USES PO PAIN TO HELP WITH PAIN.   USES CALL LIGHT
WHEN NEEDING ASSIST.  FALL PRECAUTIONS IN PLACE.  BED ALARM AND CHAIR ALARM
USES.

## 2020-05-10 VITALS — DIASTOLIC BLOOD PRESSURE: 49 MMHG | SYSTOLIC BLOOD PRESSURE: 130 MMHG

## 2020-05-10 VITALS — SYSTOLIC BLOOD PRESSURE: 135 MMHG | DIASTOLIC BLOOD PRESSURE: 52 MMHG

## 2020-05-10 NOTE — NUR
ASSUMED CARES AT 1920. ALERT AND ORIENTED PLEASANT. RIGHT BKA WITH IMMOBILIZER
IN PLACE. PAIN MED GIVEN PER PT REQUEST. SBA WITH GAIT BELT AND WALKER. UP TO
BATHROOM X 2. SLEPT MOST OF THE NIGHT. CALL LIGHT IN REACH AND BED ALARM ON.

## 2020-05-10 NOTE — NUR
ALERT AND ORIENTED X4.  UP WITH STAND BY ASSIST, GAIT BELT AND WALKER TO HOP
TO BATHROOM.  CONTINENT OF BOWEL AND BLADDER.  DRESSING REMAINS DRY AND INTACT
UNDER STUMP  AND IMMOBILIZER ON RIGHT BKA.  FALL PRECAUTIONS IN PLACE,
BED ALARM AND CHAIR ALARM USED.  USES CALL LIGHT WITHIN REACH WHEN NEEDING
ASSIST.

## 2020-05-11 VITALS — SYSTOLIC BLOOD PRESSURE: 112 MMHG | DIASTOLIC BLOOD PRESSURE: 52 MMHG

## 2020-05-11 VITALS — SYSTOLIC BLOOD PRESSURE: 144 MMHG | DIASTOLIC BLOOD PRESSURE: 57 MMHG

## 2020-05-11 NOTE — NUR
ASSUMED CARES AT 1920. ALERT AND ORIENTED. PLEASANT. RIGHT BKA. IMMOBILIZER TO
RLE. SBA WITH GAIT BELT AND WALKER. UP TO BATHROOM. DOES OWN CARES. HS SNACK
GIVEN. NO ISSUES OVERNIGHT. CALL LIGHT IN REACH AND BED ALARM ON.

## 2020-05-12 VITALS — SYSTOLIC BLOOD PRESSURE: 124 MMHG | DIASTOLIC BLOOD PRESSURE: 73 MMHG

## 2020-05-12 VITALS — SYSTOLIC BLOOD PRESSURE: 119 MMHG | DIASTOLIC BLOOD PRESSURE: 50 MMHG

## 2020-05-12 NOTE — NUR
SW called pt room to try to follow up with pt about pending wc order and pt
did not answer.  SW called pt wife who did not answer and voicemail box was
not set up.  SW to continue to follow and assist with safe dc planning.  Team
conference tomorrow.

## 2020-05-12 NOTE — NUR
ASSUMED CARES AT 1920. ALERT AND ORIENTED. PLEASANT. RIGHT BKA. SBA WITH GAIT
BELT AND WALKER. UP TO BATHROOM X 3. DOES OWN CARES. SLEPT WELL. CALL LIGHT IN
REACH AND BED ALARM ON.

## 2020-05-12 NOTE — NUR
PATIENT UP IN WHEELCHAIR THIS SHIFT. UP TO BATHROOM WITH SBA AND USE OF GAIT
BELT AND WALKER. HYDROCODONE GIVEN X 1 THIS AM PRIOR TO THERAPY. PATIENTS
WIFE BROUGHT PATIENT MORE CLOTHES AND SENT DIRTY CLOTHES HOME. INSULIN GIVEN
WITH MEALS AS ORDERED, GOOD APPETITE.

## 2020-05-13 VITALS — SYSTOLIC BLOOD PRESSURE: 148 MMHG | DIASTOLIC BLOOD PRESSURE: 61 MMHG

## 2020-05-13 VITALS — DIASTOLIC BLOOD PRESSURE: 44 MMHG | SYSTOLIC BLOOD PRESSURE: 157 MMHG

## 2020-05-13 NOTE — NUR
Team conference held today.  Plan for pt to dc home with wife on Friday. Team
recommending family training prior to dc. SW called pt wife with no answer so
SW left a detailed message about trying to schedule family training and the dc
plan.  WC to be ordered by dc and  services to follow at dc. SW to continue
to follow to assist with finalizing safe dc plan.

## 2020-05-13 NOTE — NUR
ASSESSMENT COMPLETED AS DOCUMENTED THIS MORNING.  PATIENT DEMONSTRATES
STABILITY WITH USE OF WALKER FROM BED TO BR, BM X1.  ALERT AND PLEASANT, INDEP
WITH INSULIN INJECTION AFTER NURSE DRAWS UP.  BS [id number].

## 2020-05-14 VITALS — SYSTOLIC BLOOD PRESSURE: 131 MMHG | DIASTOLIC BLOOD PRESSURE: 62 MMHG

## 2020-05-14 VITALS — SYSTOLIC BLOOD PRESSURE: 127 MMHG | DIASTOLIC BLOOD PRESSURE: 55 MMHG

## 2020-05-14 NOTE — NUR
ASSESSMENT COMPLETED AS DOCUMENTED THIS MORNING.  PATIENT PARTICIPATING WITH
THERAPY AND PROGRESSING WELL, ANTICIPATING DC TOMORROW TO HOME WITH HOME
HEALTH.  HYDROCODONE 5/325 TAB GIVEN AT 0920 THIS MORNING FOR C/O RLE PAIN.
NO ISSUES OR CHANGES IN CONDITION.

## 2020-05-14 NOTE — NUR
ASSUMED CARE @ 1922-5/13-WED.SITS IN W/C IN ROOM W/ RIGHT LE UP & IMMOBILIZER
ON RIGHT LE.STUMP  IN PLACE.CHAIR ALARM ON @ 1922.HOB UP.SIDERAILS X3
UP.WANTS ALL LIGHTS OFF & DOOR CLOSED @ NIGHT.NWB RIGHT LE OBSERVED.TURNS SELF
@ NIGHT.SEE PAIN MANAGEMENT @ 2100.ON HOURLY ROUNDS.CNA DOING ODD HOUR ROUNDS.

## 2020-05-14 NOTE — NUR
SLEPT LATE @ 0000-5/14-THURS & SLEEPING GOOD ALL NIGHT.BRP W/ GB & WALKER W/
ROSARIOA X3.TOOK ALL [facility] PUDDING & BALTAZAR.GLUCERNA AS HS SNACKS.

## 2020-05-14 NOTE — NUR
TAB called and spoke with pt wife and scheduled family training for tomorrow at
1 pm.  WC ordered through [facility] and TAB called and spoke with pt about pt
options and pt to receive wc tomorrow.  Pt to determine with Rashard when he
sees the wc whether or not he wants to accept wc or buy one that he saw
through [facility].  SW to arrange HH services at [place] with pt preference and in
network with insurance.

## 2020-05-15 VITALS — DIASTOLIC BLOOD PRESSURE: 56 MMHG | SYSTOLIC BLOOD PRESSURE: 125 MMHG

## 2020-05-15 VITALS — SYSTOLIC BLOOD PRESSURE: 125 MMHG | DIASTOLIC BLOOD PRESSURE: 56 MMHG

## 2020-05-15 NOTE — NUR
Pt to dc home with wife today.  Pt preference for [facility] services to
follow, SW faxed referral and orders to [facility].  SW discussed wc order
through [facility] to be delivered prior to pt dc.  Family training with pt wife
prior to dc today then pt wife to provide pt ride home.

## 2020-05-15 NOTE — NUR
PATIENT A&OX4. VSS. LSCTA. HRR. PATIENT UP TO BATHROOM WITH WALKER. DRESSING
CHANGED BY WES LOVETT NP FROM [facility]. PATIENT AND SPOUSE
EDUCATED ON DISCHARGE INSTRUCTIONS AND MEDICATIONS AND SHOW KNOWLEDGE AND
UNDERSTANDING. PATIENT REFUSED [facility] AND REQUESTED [facility]. JARRED TO CHANGE HOME HEALTH [facility] FOR PATIENT.
WHEELCHAIR DROPPED OFF AT HOSPITAL FROM DME. PATIENT DISCHARGED.

## 2020-05-15 NOTE — NUR
ASSUMED CARES AT 1920. ALERT AND ORIENTED. PLEASANT. IMMOBILIZER TO RIGHT LEG.
SBA WITH GAIT BELT AND WALKER. UP TO BATHROOM X3. HS SNACK GIVEN. SLEPT WELL.
CALL LIGHT IN REACH AND BED ALARM ON.